# Patient Record
Sex: MALE | URBAN - METROPOLITAN AREA
[De-identification: names, ages, dates, MRNs, and addresses within clinical notes are randomized per-mention and may not be internally consistent; named-entity substitution may affect disease eponyms.]

---

## 2020-08-13 VITALS
RESPIRATION RATE: 18 BRPM | TEMPERATURE: 98 F | SYSTOLIC BLOOD PRESSURE: 152 MMHG | DIASTOLIC BLOOD PRESSURE: 87 MMHG | HEIGHT: 68.11 IN | WEIGHT: 155.43 LBS | HEART RATE: 64 BPM

## 2020-08-13 RX ORDER — MECLIZINE HCL 12.5 MG
0 TABLET ORAL
Qty: 0 | Refills: 0 | DISCHARGE

## 2020-08-14 ENCOUNTER — INPATIENT (INPATIENT)
Facility: HOSPITAL | Age: 55
LOS: 1 days | Discharge: HOME | DRG: 473 | End: 2020-08-16
Attending: NEUROLOGICAL SURGERY | Admitting: NEUROLOGICAL SURGERY
Payer: OTHER MISCELLANEOUS

## 2020-08-14 DIAGNOSIS — M50.20 OTHER CERVICAL DISC DISPLACEMENT, UNSPECIFIED CERVICAL REGION: ICD-10-CM

## 2020-08-14 LAB
BLD GP AB SCN SERPL QL: NEGATIVE — SIGNIFICANT CHANGE UP
RH IG SCN BLD-IMP: POSITIVE — SIGNIFICANT CHANGE UP

## 2020-08-14 PROCEDURE — 99024 POSTOP FOLLOW-UP VISIT: CPT

## 2020-08-14 PROCEDURE — 88304 TISSUE EXAM BY PATHOLOGIST: CPT | Mod: 26

## 2020-08-14 RX ORDER — SODIUM CHLORIDE 9 MG/ML
1000 INJECTION, SOLUTION INTRAVENOUS
Refills: 0 | Status: DISCONTINUED | OUTPATIENT
Start: 2020-08-14 | End: 2020-08-16

## 2020-08-14 RX ORDER — ACETAMINOPHEN 500 MG
975 TABLET ORAL EVERY 8 HOURS
Refills: 0 | Status: DISCONTINUED | OUTPATIENT
Start: 2020-08-14 | End: 2020-08-14

## 2020-08-14 RX ORDER — BENZOCAINE AND MENTHOL 5; 1 G/100ML; G/100ML
1 LIQUID ORAL
Refills: 0 | Status: DISCONTINUED | OUTPATIENT
Start: 2020-08-14 | End: 2020-08-16

## 2020-08-14 RX ORDER — MECLIZINE HCL 12.5 MG
1 TABLET ORAL
Qty: 0 | Refills: 0 | DISCHARGE

## 2020-08-14 RX ORDER — CYCLOBENZAPRINE HYDROCHLORIDE 10 MG/1
10 TABLET, FILM COATED ORAL THREE TIMES A DAY
Refills: 0 | Status: DISCONTINUED | OUTPATIENT
Start: 2020-08-14 | End: 2020-08-14

## 2020-08-14 RX ORDER — HYDROMORPHONE HYDROCHLORIDE 2 MG/ML
0.5 INJECTION INTRAMUSCULAR; INTRAVENOUS; SUBCUTANEOUS EVERY 4 HOURS
Refills: 0 | Status: DISCONTINUED | OUTPATIENT
Start: 2020-08-14 | End: 2020-08-16

## 2020-08-14 RX ORDER — SODIUM CHLORIDE 9 MG/ML
1000 INJECTION, SOLUTION INTRAVENOUS
Refills: 0 | Status: DISCONTINUED | OUTPATIENT
Start: 2020-08-14 | End: 2020-08-14

## 2020-08-14 RX ORDER — CYCLOBENZAPRINE HYDROCHLORIDE 10 MG/1
10 TABLET, FILM COATED ORAL THREE TIMES A DAY
Refills: 0 | Status: DISCONTINUED | OUTPATIENT
Start: 2020-08-14 | End: 2020-08-16

## 2020-08-14 RX ORDER — ZOLPIDEM TARTRATE 10 MG/1
1 TABLET ORAL
Qty: 0 | Refills: 0 | DISCHARGE

## 2020-08-14 RX ORDER — POVIDONE-IODINE 5 %
1 AEROSOL (ML) TOPICAL ONCE
Refills: 0 | Status: COMPLETED | OUTPATIENT
Start: 2020-08-14 | End: 2020-08-14

## 2020-08-14 RX ORDER — OXYCODONE HYDROCHLORIDE 5 MG/1
5 TABLET ORAL EVERY 4 HOURS
Refills: 0 | Status: DISCONTINUED | OUTPATIENT
Start: 2020-08-14 | End: 2020-08-14

## 2020-08-14 RX ORDER — OXYCODONE HYDROCHLORIDE 5 MG/1
5 TABLET ORAL EVERY 4 HOURS
Refills: 0 | Status: DISCONTINUED | OUTPATIENT
Start: 2020-08-14 | End: 2020-08-16

## 2020-08-14 RX ORDER — FAMOTIDINE 10 MG/ML
20 INJECTION INTRAVENOUS EVERY 12 HOURS
Refills: 0 | Status: DISCONTINUED | OUTPATIENT
Start: 2020-08-14 | End: 2020-08-14

## 2020-08-14 RX ORDER — TOPIRAMATE 25 MG
1 TABLET ORAL
Qty: 0 | Refills: 0 | DISCHARGE

## 2020-08-14 RX ORDER — DEXAMETHASONE 0.5 MG/5ML
4 ELIXIR ORAL EVERY 6 HOURS
Refills: 0 | Status: DISCONTINUED | OUTPATIENT
Start: 2020-08-14 | End: 2020-08-16

## 2020-08-14 RX ORDER — MECLIZINE HCL 12.5 MG
25 TABLET ORAL EVERY 12 HOURS
Refills: 0 | Status: DISCONTINUED | OUTPATIENT
Start: 2020-08-14 | End: 2020-08-16

## 2020-08-14 RX ORDER — FAMOTIDINE 10 MG/ML
20 INJECTION INTRAVENOUS DAILY
Refills: 0 | Status: DISCONTINUED | OUTPATIENT
Start: 2020-08-14 | End: 2020-08-16

## 2020-08-14 RX ORDER — ACETAMINOPHEN 500 MG
975 TABLET ORAL EVERY 8 HOURS
Refills: 0 | Status: DISCONTINUED | OUTPATIENT
Start: 2020-08-16 | End: 2020-08-16

## 2020-08-14 RX ORDER — HYDROMORPHONE HYDROCHLORIDE 2 MG/ML
0.5 INJECTION INTRAMUSCULAR; INTRAVENOUS; SUBCUTANEOUS EVERY 4 HOURS
Refills: 0 | Status: DISCONTINUED | OUTPATIENT
Start: 2020-08-14 | End: 2020-08-14

## 2020-08-14 RX ADMIN — Medication 1 APPLICATION(S): at 07:03

## 2020-08-14 RX ADMIN — HYDROMORPHONE HYDROCHLORIDE 0.5 MILLIGRAM(S): 2 INJECTION INTRAMUSCULAR; INTRAVENOUS; SUBCUTANEOUS at 23:59

## 2020-08-14 RX ADMIN — Medication 4 MILLIGRAM(S): at 17:21

## 2020-08-14 RX ADMIN — BENZOCAINE AND MENTHOL 1 LOZENGE: 5; 1 LIQUID ORAL at 18:36

## 2020-08-14 RX ADMIN — HYDROMORPHONE HYDROCHLORIDE 0.5 MILLIGRAM(S): 2 INJECTION INTRAMUSCULAR; INTRAVENOUS; SUBCUTANEOUS at 16:52

## 2020-08-14 RX ADMIN — HYDROMORPHONE HYDROCHLORIDE 0.5 MILLIGRAM(S): 2 INJECTION INTRAMUSCULAR; INTRAVENOUS; SUBCUTANEOUS at 23:37

## 2020-08-14 RX ADMIN — SODIUM CHLORIDE 100 MILLILITER(S): 9 INJECTION, SOLUTION INTRAVENOUS at 12:55

## 2020-08-14 RX ADMIN — HYDROMORPHONE HYDROCHLORIDE 0.5 MILLIGRAM(S): 2 INJECTION INTRAMUSCULAR; INTRAVENOUS; SUBCUTANEOUS at 16:38

## 2020-08-14 NOTE — PROGRESS NOTE ADULT - SUBJECTIVE AND OBJECTIVE BOX
NEUROSURGERY POST OP NOTE:     POD# 0 S/P C5-C6, C6-C7 ACDF     S:   Patient seen and examined at bedside in PACU. Pt Mandarin speaking, no acute issues post-operatively.  Pt tolerating sips of water, cepacol lozenges ordered for post-op sore throat. 1 drain in-place.       T(C): 35.9 (08-14-20 @ 12:30), Max: 35.9 (08-14-20 @ 12:30)  HR: 83 (08-14-20 @ 17:00) (70 - 85)  BP: 141/82 (08-14-20 @ 17:00) (136/74 - 165/85)  RR: 15 (08-14-20 @ 17:00) (10 - 18)  SpO2: 100% (08-14-20 @ 17:00) (100% - 100%)      08-14-20 @ 07:01  -  08-14-20 @ 17:37  --------------------------------------------------------  IN: 800 mL / OUT: 20 mL / NET: 780 mL        benzocaine 15 mG/menthol 3.6 mG (Sugar-Free) Lozenge 1 Lozenge Oral five times a day PRN  cyclobenzaprine 10 milliGRAM(s) Oral three times a day PRN  dexAMETHasone  Injectable 4 milliGRAM(s) IV Push every 6 hours  famotidine    Tablet 20 milliGRAM(s) Oral daily  HYDROmorphone  Injectable 0.5 milliGRAM(s) IV Push every 4 hours PRN  lactated ringers. 1000 milliLiter(s) IV Continuous <Continuous>  meclizine 25 milliGRAM(s) Oral every 12 hours PRN  oxyCODONE    IR 5 milliGRAM(s) Oral every 4 hours PRN      RADIOLOGY:     Exam:  General: NAD, pt is laying comfortably in hospital bed, A&O x3   HEENT: PERRL b/l 3mm, EOMI b/l, face symmetric, tongue midline, neck supple, non-tender to palpation, ACDF incision dressing C/D/I   Cardiovascular: regular rate and rhythm, normal S1 and S2   Respiratory: lungs clear to auscultation throughout, no wheezing, rhonchi, or crackles   GI: normoactive BS to auscultation, abd soft, NTND   Neuro: no aphasia, speech clear, no dysmetria, no pronator drift  LOVE x4 spontaneously, RUE strength 4/5, R  strength 3/5, LUE 4+/5, L  strength 4/5   RLE/LLE strength 5/5   diminished sensation of RUE compared to LUE, o/w SILT     WOUND/DRAINS: AUDI x1 in-place, ACDF incision site dressing C/D/I       Assessment: 54yMale POD# 0 S/P C5-C6, C6-C7 ACDF         Plan:    - neuro checks q4hrs   - telemetry status   - vitals checks   - PO pain mgmt PRN; oxycodone 5mg PRN   - decadron 4mg q6hrs, pt to be d/c'd on medrol dose pack   - percocet on d/c   - flexeril 10mg TID PRN for muscle spasm   - monitor AUDI output   - pt will need a soft collar upon discharge   - no post-operative imaging   - SCDs b/l LE, hold DVT chemoprophylaxis       Assessment:  Present when checked    DISPO: tele status, full code, dispo pend   Assessment and plan discussed w/ Dr. Haskins     []  GCS  E   V  M     Heart Failure: []Acute, [] acute on chronic , []chronic  Heart Failure:  [] Diastolic (HFpEF), [] Systolic (HFrEF), []Combined (HFpEF and HFrEF), [] RHF, [] Pulm HTN, [] Other    [] DEYSI, [] ATN, [] AIN, [] other  [] CKD1, [] CKD2, [] CKD 3, [] CKD 4, [] CKD 5, []ESRD    Encephalopathy: [] Metabolic, [] Hepatic, [] toxic, [] Neurological, [] Other    Abnormal Nurtitional Status: [] malnurtition (see nutrition note), [ ]underweight: BMI < 19, [] morbid obesity: BMI >40, [] Cachexia    [] Sepsis  [] hypovolemic shock,[] cardiogenic shock, [] hemorrhagic shock, [] neuogenic shock  [] Acute Respiratory Failure  []Cerebral edema, [] Brain compression/ herniation,   [] Functional quadriplegia  [] Acute blood loss anemia

## 2020-08-14 NOTE — H&P ADULT - NSHPPHYSICALEXAM_GEN_ALL_CORE
NAD, AAOx3  Atraumatic, normocephalic  MMM, EOMI  Normal breathing pattern  soft, NT, ND abd  LOVE, all CN intact  sensation intact LUE MN/UN/RN, diminished RUE AXN/MN/RN/UN  diminished  strength to R hand  reflexes normal, neg clonus  neg SLR BLE

## 2020-08-14 NOTE — H&P ADULT - NSICDXPASTMEDICALHX_GEN_ALL_CORE_FT
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

## 2020-08-14 NOTE — PACU DISCHARGE NOTE - COMMENTS
Pt met criteria for discharge- neurologically and hemodynamically stable- RUE strength improving s/p ACDF- tolerating po intake well-denies N/V and sensory deficits in RUE- voided freely urinal- [facility] Pt left unit via bed to 945-2

## 2020-08-14 NOTE — H&P ADULT - PROBLEM SELECTOR PLAN 1
OR today for C5-6 anterior cervical discectomy and fusion, possible other levels. OR today for C5-6, C6-7 anterior cervical discectomy and fusion, possible other levels.  Risks/benefits/alternatives were explained to the patient who understands and agrees to proceed with procedure.  Translation used for Chinese Mandarin

## 2020-08-14 NOTE — H&P ADULT - HISTORY OF PRESENT ILLNESS
Patient is a 53 y/o male who presents for elective anterior cervical laminectomy and fusion.  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  Patient hit his head on ceiling and has had persistent neck pain hindering his daily activities.  The pain has been resistant to conservative means including oral meds and exercises.  The pain is associated with right sided radiation of pain, numbness/tingling and weakness.  He denies any current CP/SOB/N/V/D/fever/melena/dysuria. Patient is a 55 y/o male who presents for elective anterior cervical laminectomy and fusion.  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  Patient hit his head on ceiling and has had persistent neck pain and RUE weakness hindering his daily activities.  The pain has been resistant to conservative means including oral meds and exercises.  The pain is associated with right sided radiation of pain, numbness/tingling and weakness.  He denies any current CP/SOB/N/V/D/fever/melena/dysuria.

## 2020-08-14 NOTE — BRIEF OPERATIVE NOTE - NSICDXBRIEFPROCEDURE_GEN_ALL_CORE_FT
PROCEDURES:  Anterior cervical discectomy and fusion (ACDF) at 2 levels 14-Aug-2020 12:15:47  Jaison Ward

## 2020-08-15 LAB
ANION GAP SERPL CALC-SCNC: 13 MMOL/L — SIGNIFICANT CHANGE UP (ref 5–17)
BUN SERPL-MCNC: 18 MG/DL — SIGNIFICANT CHANGE UP (ref 7–23)
CALCIUM SERPL-MCNC: 10.2 MG/DL — SIGNIFICANT CHANGE UP (ref 8.4–10.5)
CHLORIDE SERPL-SCNC: 102 MMOL/L — SIGNIFICANT CHANGE UP (ref 96–108)
CO2 SERPL-SCNC: 22 MMOL/L — SIGNIFICANT CHANGE UP (ref 22–31)
CREAT SERPL-MCNC: 0.77 MG/DL — SIGNIFICANT CHANGE UP (ref 0.5–1.3)
GLUCOSE SERPL-MCNC: 182 MG/DL — HIGH (ref 70–99)
HCT VFR BLD CALC: 44.7 % — SIGNIFICANT CHANGE UP (ref 39–50)
HCV AB S/CO SERPL IA: 0.07 S/CO — SIGNIFICANT CHANGE UP
HCV AB SERPL-IMP: SIGNIFICANT CHANGE UP
HGB BLD-MCNC: 14.7 G/DL — SIGNIFICANT CHANGE UP (ref 13–17)
MCHC RBC-ENTMCNC: 30.1 PG — SIGNIFICANT CHANGE UP (ref 27–34)
MCHC RBC-ENTMCNC: 32.9 GM/DL — SIGNIFICANT CHANGE UP (ref 32–36)
MCV RBC AUTO: 91.4 FL — SIGNIFICANT CHANGE UP (ref 80–100)
NRBC # BLD: 0 /100 WBCS — SIGNIFICANT CHANGE UP (ref 0–0)
PLATELET # BLD AUTO: 271 K/UL — SIGNIFICANT CHANGE UP (ref 150–400)
POTASSIUM SERPL-MCNC: 4.1 MMOL/L — SIGNIFICANT CHANGE UP (ref 3.5–5.3)
POTASSIUM SERPL-SCNC: 4.1 MMOL/L — SIGNIFICANT CHANGE UP (ref 3.5–5.3)
RBC # BLD: 4.89 M/UL — SIGNIFICANT CHANGE UP (ref 4.2–5.8)
RBC # FLD: 11.8 % — SIGNIFICANT CHANGE UP (ref 10.3–14.5)
SODIUM SERPL-SCNC: 137 MMOL/L — SIGNIFICANT CHANGE UP (ref 135–145)
WBC # BLD: 17.2 K/UL — HIGH (ref 3.8–10.5)
WBC # FLD AUTO: 17.2 K/UL — HIGH (ref 3.8–10.5)

## 2020-08-15 RX ORDER — POLYETHYLENE GLYCOL 3350 17 G/17G
17 POWDER, FOR SOLUTION ORAL DAILY
Refills: 0 | Status: DISCONTINUED | OUTPATIENT
Start: 2020-08-15 | End: 2020-08-16

## 2020-08-15 RX ORDER — SENNA PLUS 8.6 MG/1
2 TABLET ORAL AT BEDTIME
Refills: 0 | Status: DISCONTINUED | OUTPATIENT
Start: 2020-08-15 | End: 2020-08-16

## 2020-08-15 RX ADMIN — Medication 4 MILLIGRAM(S): at 01:07

## 2020-08-15 RX ADMIN — POLYETHYLENE GLYCOL 3350 17 GRAM(S): 17 POWDER, FOR SOLUTION ORAL at 11:54

## 2020-08-15 RX ADMIN — Medication 4 MILLIGRAM(S): at 06:30

## 2020-08-15 RX ADMIN — FAMOTIDINE 20 MILLIGRAM(S): 10 INJECTION INTRAVENOUS at 11:54

## 2020-08-15 RX ADMIN — Medication 4 MILLIGRAM(S): at 11:55

## 2020-08-15 RX ADMIN — SENNA PLUS 2 TABLET(S): 8.6 TABLET ORAL at 22:38

## 2020-08-15 RX ADMIN — Medication 4 MILLIGRAM(S): at 17:30

## 2020-08-15 NOTE — DISCHARGE NOTE PROVIDER - NSDCCPTREATMENT_GEN_ALL_CORE_FT
PRINCIPAL PROCEDURE  Procedure: Anterior cervical discectomy and fusion (ACDF) at 2 levels  Findings and Treatment: C5-7

## 2020-08-15 NOTE — PHYSICAL THERAPY INITIAL EVALUATION ADULT - ADDITIONAL COMMENTS
Pt. lives in 4th floor walk up, uses a cane and is independent with mobility. Pt. is right handed, states he ahd weakness and decreased sensation prior to surgery and has not changed post-op.

## 2020-08-15 NOTE — DISCHARGE NOTE PROVIDER - NSDCMRMEDTOKEN_GEN_ALL_CORE_FT
aspirin 81 mg oral tablet: 1 tab(s) orally once a day  meclizine 25 mg oral tablet: 1 tab(s) orally , As Needed  traMADol 50 mg oral tablet: 1 tab(s) orally every 6 hours, As Needed  Trokendi XR 50 mg oral capsule, extended release: 1 cap(s) orally once a day, As Needed  zolpidem 5 mg oral tablet: 1 tab(s) orally once a day (at bedtime), As Needed cyclobenzaprine 10 mg oral tablet: 1 tab(s) orally every 8 hours, As Needed -Muscle Spasm MDD:3 tabs  meclizine 25 mg oral tablet: 1 tab(s) orally , As Needed  Medrol Dosepak 4 mg oral tablet: See packet for tapering instructions  oxyCODONE 5 mg oral tablet: Take 1-2  tab(s) orally every 4 hours, as needed for moderate to severe pain MDD:12 tabs  Protonix 20 mg oral delayed release tablet: 1 tab(s) orally once a day (in the morning)   Trokendi XR 50 mg oral capsule, extended release: 1 cap(s) orally once a day, As Needed  zolpidem 5 mg oral tablet: 1 tab(s) orally once a day (at bedtime), As Needed

## 2020-08-15 NOTE — DISCHARGE NOTE PROVIDER - NSDCACTIVITY_GEN_ALL_CORE
Stairs allowed/Walking - Outdoors allowed/Showering allowed/Walking - Indoors allowed/No heavy lifting/straining/No restrictions

## 2020-08-15 NOTE — PHYSICAL THERAPY INITIAL EVALUATION ADULT - GENERAL OBSERVATIONS, REHAB EVAL
Pt. received supine in bed in no acute distress, +EKG, AUDI, SCD, cervical incision C/D/I. Patient states weakness and sensation on right side is how it was prior to surgery.

## 2020-08-15 NOTE — DISCHARGE NOTE PROVIDER - CARE PROVIDER_API CALL
Louie Haskins  NEUROSURGERY  [address]  [place] [address]  Phone: [phone]  Fax: [phone]  Follow Up Time:

## 2020-08-15 NOTE — PHYSICAL THERAPY INITIAL EVALUATION ADULT - PERTINENT HX OF CURRENT PROBLEM, REHAB EVAL
55 y/o male who presents for elective anterior cervical laminectomy and fusion.  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  Patient hit his head on ceiling and has had persistent neck pain and RUE weakness hindering his daily activities.  The pain has been resistant to conservative means including oral meds and exercises.  The pain is associated with right sided radiation of pain, numbness/tingling and weakness.

## 2020-08-15 NOTE — DISCHARGE NOTE PROVIDER - NSDCFUADDINST_GEN_ALL_CORE_FT
Keep your wound clean and dry   Once a day, ask a family member to check your incision for signs of infection such as: Redness, swelling, tenderness, drainage  • You may use stairs as tolerated.  • You may shower briefly, unless you told not to by your doctor.   Cover your entire incision to make sure it does not get wet. If it gets wet, dry it off.   No tub baths or swimming for two weeks.   Take pain medicine as prescribed.   You may find it helpful to take it for morning stiffness or for soreness when trying to sleep.  o Pain medication can cause constipation. Eating food with fiber such as fruits and  vegetables, and drinking liquids may help prevent constipation.  Call you doctor immediately if you have:  • Any new numbness, tingling, or weakness in your arms and legs.  Worsening pain not responsive to pain meds, Fever of 101° F or more.    Take protonix once a day 1 hour before breakfast while you are on steroids  You must call Dr. Haskins's office to make a 2 week follow- up appointment.  To make your appointment for any questions, please call: [phone] Keep your wound clean and dry   Once a day, ask a family member to check your incision for signs of infection such as: Redness, swelling, tenderness, drainage  • You may use stairs as tolerated.  • You may shower briefly, unless you told not to by your doctor.   Cover your entire incision to make sure it does not get wet. If it gets wet, dry it off.   No tub baths or swimming for two weeks.   Take pain medicine as prescribed.   You may find it helpful to take it for morning stiffness or for soreness when trying to sleep.  o Pain medication can cause constipation. Eating food with fiber such as fruits and  vegetables, and drinking liquids may help prevent constipation.  Call you doctor immediately if you have:  • Any new numbness, tingling, or weakness in your arms and legs.  Worsening pain not responsive to pain meds, Fever of 101° F or more.    You were given a script for outpatient physical therapy    Take protonix once a day 1 hour before breakfast while you are on steroids    You must call Dr. Haskins's office to make a 2 week follow- up appointment.  To make your appointment for any questions, please call: [phone]

## 2020-08-15 NOTE — DISCHARGE NOTE PROVIDER - HOSPITAL COURSE
Patient is a 55 y/o male who presents for elective anterior cervical laminectomy and fusion.  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  Patient hit his head on ceiling and has had persistent neck pain and RUE weakness hindering his daily activities.  The pain has been resistant to conservative means including oral meds and exercises.  The pain is associated with right sided radiation of pain, numbness/tingling and weakness.  He denies any current CP/SOB/N/V/D/fever/melena/dysuria.          Hospital Course:    POD# 0 S/P C5-C6, C6-C7 ACDF     POD#1: neuro intact pain controlled dc hoem today Patient is a 55 y/o male who presents for elective anterior cervical laminectomy and fusion.  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  Patient hit his head on ceiling and has had persistent neck pain and RUE weakness hindering his daily activities.  The pain has been resistant to conservative means including oral meds and exercises.  The pain is associated with right sided radiation of pain, numbness/tingling and weakness.  He denies any current CP/SOB/N/V/D/fever/melena/dysuria.          Hospital Course:    POD# 0 S/P C5-C6, C6-C7 ACDF     POD#1: neuro intact pain controlled    POD#2 DELPHINE overnight. Neuro exam stable. AUDI drain removed. Pt cleared by PT for discharge with outpatient PT

## 2020-08-16 VITALS
SYSTOLIC BLOOD PRESSURE: 146 MMHG | HEART RATE: 87 BPM | TEMPERATURE: 99 F | DIASTOLIC BLOOD PRESSURE: 76 MMHG | RESPIRATION RATE: 19 BRPM

## 2020-08-16 LAB
ANION GAP SERPL CALC-SCNC: 11 MMOL/L — SIGNIFICANT CHANGE UP (ref 5–17)
BUN SERPL-MCNC: 20 MG/DL — SIGNIFICANT CHANGE UP (ref 7–23)
CALCIUM SERPL-MCNC: 10 MG/DL — SIGNIFICANT CHANGE UP (ref 8.4–10.5)
CHLORIDE SERPL-SCNC: 99 MMOL/L — SIGNIFICANT CHANGE UP (ref 96–108)
CO2 SERPL-SCNC: 24 MMOL/L — SIGNIFICANT CHANGE UP (ref 22–31)
CREAT SERPL-MCNC: 0.74 MG/DL — SIGNIFICANT CHANGE UP (ref 0.5–1.3)
GLUCOSE SERPL-MCNC: 262 MG/DL — HIGH (ref 70–99)
HCT VFR BLD CALC: 43.1 % — SIGNIFICANT CHANGE UP (ref 39–50)
HGB BLD-MCNC: 14.2 G/DL — SIGNIFICANT CHANGE UP (ref 13–17)
MCHC RBC-ENTMCNC: 30.1 PG — SIGNIFICANT CHANGE UP (ref 27–34)
MCHC RBC-ENTMCNC: 32.9 GM/DL — SIGNIFICANT CHANGE UP (ref 32–36)
MCV RBC AUTO: 91.3 FL — SIGNIFICANT CHANGE UP (ref 80–100)
NRBC # BLD: 0 /100 WBCS — SIGNIFICANT CHANGE UP (ref 0–0)
PLATELET # BLD AUTO: 267 K/UL — SIGNIFICANT CHANGE UP (ref 150–400)
POTASSIUM SERPL-MCNC: 4.1 MMOL/L — SIGNIFICANT CHANGE UP (ref 3.5–5.3)
POTASSIUM SERPL-SCNC: 4.1 MMOL/L — SIGNIFICANT CHANGE UP (ref 3.5–5.3)
RBC # BLD: 4.72 M/UL — SIGNIFICANT CHANGE UP (ref 4.2–5.8)
RBC # FLD: 12 % — SIGNIFICANT CHANGE UP (ref 10.3–14.5)
SODIUM SERPL-SCNC: 134 MMOL/L — LOW (ref 135–145)
WBC # BLD: 17.6 K/UL — HIGH (ref 3.8–10.5)
WBC # FLD AUTO: 17.6 K/UL — HIGH (ref 3.8–10.5)

## 2020-08-16 PROCEDURE — 99024 POSTOP FOLLOW-UP VISIT: CPT

## 2020-08-16 RX ORDER — METOCLOPRAMIDE HCL 10 MG
10 TABLET ORAL ONCE
Refills: 0 | Status: COMPLETED | OUTPATIENT
Start: 2020-08-16 | End: 2020-08-16

## 2020-08-16 RX ORDER — PANTOPRAZOLE SODIUM 20 MG/1
40 TABLET, DELAYED RELEASE ORAL ONCE
Refills: 0 | Status: COMPLETED | OUTPATIENT
Start: 2020-08-16 | End: 2020-08-16

## 2020-08-16 RX ORDER — CYCLOBENZAPRINE HYDROCHLORIDE 10 MG/1
1 TABLET, FILM COATED ORAL
Qty: 21 | Refills: 0
Start: 2020-08-16 | End: 2020-08-22

## 2020-08-16 RX ORDER — PANTOPRAZOLE SODIUM 20 MG/1
1 TABLET, DELAYED RELEASE ORAL
Qty: 5 | Refills: 0
Start: 2020-08-16 | End: 2020-08-20

## 2020-08-16 RX ORDER — TRAMADOL HYDROCHLORIDE 50 MG/1
1 TABLET ORAL
Qty: 0 | Refills: 0 | DISCHARGE

## 2020-08-16 RX ORDER — OXYCODONE HYDROCHLORIDE 5 MG/1
2 TABLET ORAL
Qty: 84 | Refills: 0
Start: 2020-08-16 | End: 2020-08-22

## 2020-08-16 RX ORDER — ASPIRIN/CALCIUM CARB/MAGNESIUM 324 MG
1 TABLET ORAL
Qty: 0 | Refills: 0 | DISCHARGE

## 2020-08-16 RX ADMIN — FAMOTIDINE 20 MILLIGRAM(S): 10 INJECTION INTRAVENOUS at 09:32

## 2020-08-16 RX ADMIN — Medication 975 MILLIGRAM(S): at 13:46

## 2020-08-16 RX ADMIN — Medication 975 MILLIGRAM(S): at 05:51

## 2020-08-16 RX ADMIN — Medication 975 MILLIGRAM(S): at 06:45

## 2020-08-16 RX ADMIN — Medication 4 MILLIGRAM(S): at 00:38

## 2020-08-16 RX ADMIN — BENZOCAINE AND MENTHOL 1 LOZENGE: 5; 1 LIQUID ORAL at 10:37

## 2020-08-16 RX ADMIN — Medication 4 MILLIGRAM(S): at 11:20

## 2020-08-16 RX ADMIN — PANTOPRAZOLE SODIUM 40 MILLIGRAM(S): 20 TABLET, DELAYED RELEASE ORAL at 13:46

## 2020-08-16 RX ADMIN — Medication 4 MILLIGRAM(S): at 05:51

## 2020-08-16 RX ADMIN — Medication 10 MILLIGRAM(S): at 07:44

## 2020-08-16 NOTE — DISCHARGE NOTE NURSING/CASE MANAGEMENT/SOCIAL WORK - PATIENT PORTAL LINK FT
You can access the FollowMyHealth Patient Portal offered by [facility] by registering at the following website: [email or web address]. By joining [email or web address] FollowMyHealth portal, you will also be able to view your health information using other applications (apps) compatible with our system.

## 2020-08-16 NOTE — PROCEDURE NOTE - GENERAL PROCEDURE DETAILS
AUDI incision site was prepped in a sterile fashion. Anchoring suture was first removed. AUDI drain catheter was removed in its entirety without resistance. Steri strips applied at incision site. No further drainage noted.

## 2020-08-16 NOTE — PROGRESS NOTE ADULT - SUBJECTIVE AND OBJECTIVE BOX
8/14 POD# 0 S/P C5-C6, C6-C7 ACDF   8/16 POD#2 AUDI , soft collar, Dec 4q6, Home DC with Medrol Dose Pack    ICU Vital Signs Last 24 Hrs  T(C): 36.9 (16 Aug 2020 00:39), Max: 37.4 (15 Aug 2020 14:38)  T(F): 98.4 (16 Aug 2020 00:39), Max: 99.4 (15 Aug 2020 14:38)  HR: 61 (16 Aug 2020 00:39) (61 - 98)  BP: 114/60 (16 Aug 2020 00:39) (114/60 - 139/82)  BP(mean): --  ABP: --  ABP(mean): --  RR: 16 (16 Aug 2020 00:39) (16 - 19)  SpO2: 95% (16 Aug 2020 00:39) (94% - 98%)            benzocaine 15 mG/menthol 3.6 mG (Sugar-Free) Lozenge 1 Lozenge Oral five times a day PRN  cyclobenzaprine 10 milliGRAM(s) Oral three times a day PRN  dexAMETHasone  Injectable 4 milliGRAM(s) IV Push every 6 hours  famotidine    Tablet 20 milliGRAM(s) Oral daily  HYDROmorphone  Injectable 0.5 milliGRAM(s) IV Push every 4 hours PRN  lactated ringers. 1000 milliLiter(s) IV Continuous <Continuous>  meclizine 25 milliGRAM(s) Oral every 12 hours PRN  oxyCODONE    IR 5 milliGRAM(s) Oral every 4 hours PRN      RADIOLOGY:     Exam:  General: NAD, pt is laying comfortably in hospital bed, A&O x3   HEENT: PERRL b/l 3mm, EOMI b/l, face symmetric, tongue midline, neck supple, non-tender to palpation, ACDF incision dressing C/D/I   Cardiovascular: regular rate and rhythm, normal S1 and S2   Respiratory: lungs clear to auscultation throughout, no wheezing, rhonchi, or crackles   GI: normoactive BS to auscultation, abd soft, NTND   Neuro: no aphasia, speech clear, no dysmetria, no pronator drift  LOVE x4 spontaneously, RUE strength 4/5, R  strength 3/5, LUE 4+/5, L  strength 4/5   RLE/LLE strength 5/5   diminished sensation of RUE compared to LUE, o/w SILT     WOUND/DRAINS: AUDI x1 in-place, ACDF incision site dressing C/D/I       Assessment: 54yMale POD# 2 S/P C5-C6, C6-C7 ACDF         Plan:    - neuro checks q4hrs   - telemetry status   - vitals checks   - PO pain mgmt PRN; oxycodone 5mg PRN   - decadron 4mg q6hrs, pt to be d/c'd on medrol dose pack   - monitor AUDI output   - pt will need a soft collar upon discharge   - no post-operative imaging   - SCDs b/l LE, hold DVT chemoprophylaxis       Assessment:  Present when checked    DISPO: tele status, full code, dispo pend   Assessment and plan discussed w/ Dr. Haskins     []  GCS  E   V  M     Heart Failure: []Acute, [] acute on chronic , []chronic  Heart Failure:  [] Diastolic (HFpEF), [] Systolic (HFrEF), []Combined (HFpEF and HFrEF), [] RHF, [] Pulm HTN, [] Other    [] DEYSI, [] ATN, [] AIN, [] other  [] CKD1, [] CKD2, [] CKD 3, [] CKD 4, [] CKD 5, []ESRD    Encephalopathy: [] Metabolic, [] Hepatic, [] toxic, [] Neurological, [] Other    Abnormal Nurtitional Status: [] malnurtition (see nutrition note), [ ]underweight: BMI < 19, [] morbid obesity: BMI >40, [] Cachexia    [] Sepsis  [] hypovolemic shock,[] cardiogenic shock, [] hemorrhagic shock, [] neuogenic shock  [] Acute Respiratory Failure  []Cerebral edema, [] Brain compression/ herniation,   [] Functional quadriplegia  [] Acute blood loss anemia

## 2020-08-18 PROCEDURE — 88304 TISSUE EXAM BY PATHOLOGIST: CPT

## 2020-08-18 PROCEDURE — 86901 BLOOD TYPING SEROLOGIC RH(D): CPT

## 2020-08-18 PROCEDURE — 76000 FLUOROSCOPY <1 HR PHYS/QHP: CPT

## 2020-08-18 PROCEDURE — 86850 RBC ANTIBODY SCREEN: CPT

## 2020-08-18 PROCEDURE — C1889: CPT

## 2020-08-18 PROCEDURE — 80048 BASIC METABOLIC PNL TOTAL CA: CPT

## 2020-08-18 PROCEDURE — 86803 HEPATITIS C AB TEST: CPT

## 2020-08-18 PROCEDURE — 36415 COLL VENOUS BLD VENIPUNCTURE: CPT

## 2020-08-18 PROCEDURE — 97116 GAIT TRAINING THERAPY: CPT

## 2020-08-18 PROCEDURE — 97161 PT EVAL LOW COMPLEX 20 MIN: CPT

## 2020-08-18 PROCEDURE — 85027 COMPLETE CBC AUTOMATED: CPT

## 2020-08-18 PROCEDURE — C1713: CPT

## 2020-08-19 DIAGNOSIS — Z87.891 PERSONAL HISTORY OF NICOTINE DEPENDENCE: ICD-10-CM

## 2020-08-19 DIAGNOSIS — Z79.82 LONG TERM (CURRENT) USE OF ASPIRIN: ICD-10-CM

## 2020-08-19 DIAGNOSIS — M50.022 CERVICAL DISC DISORDER AT C5-C6 LEVEL WITH MYELOPATHY: ICD-10-CM

## 2020-08-19 DIAGNOSIS — G43.909 MIGRAINE, UNSPECIFIED, NOT INTRACTABLE, WITHOUT STATUS MIGRAINOSUS: ICD-10-CM

## 2020-08-19 DIAGNOSIS — Z79.891 LONG TERM (CURRENT) USE OF OPIATE ANALGESIC: ICD-10-CM

## 2020-08-21 LAB — SURGICAL PATHOLOGY STUDY: SIGNIFICANT CHANGE UP

## 2021-03-02 NOTE — PATIENT PROFILE ADULT - TOBACCO USE
-- DO NOT REPLY / DO NOT REPLY ALL --  -- Message is from the [facility]--    Referral Request  Name of Specialist: Dr. Lalitha Alfaro  Provider's specialty: Vascular Surgery    Medical condition for referral:  Pain and swelling of leggs, also need ultrasound and progress notes faxed    Is this a NEW request?: yes      Referral ordered by: Shantel Lilly MD      Insurance type: [employer]VANTAGE      Payor:  [employer] / Plan:  BE A 076/053 / Product Type:  MEDICARE ADVANTAGE      Preferred Delivery Method   Fax - number to send to: Fax     Caller Information       Type Contact Phone    03/01/2021 02:38 PM CST Phone (Incoming) [facility] (Provider) [phone]     Fax           Alternative phone number: no    Turnaround time given to caller:   \"This message will be sent to [state Provider's full name]. The clinical team will return your call as soon as they review your message. Typically, it takes 3 business days to process referral requests.\"  
referral entered faxed as well as supporting documentation   
Former smoker